# Patient Record
Sex: FEMALE | Race: ASIAN | NOT HISPANIC OR LATINO | URBAN - METROPOLITAN AREA
[De-identification: names, ages, dates, MRNs, and addresses within clinical notes are randomized per-mention and may not be internally consistent; named-entity substitution may affect disease eponyms.]

---

## 2017-04-15 ENCOUNTER — EMERGENCY (EMERGENCY)
Facility: HOSPITAL | Age: 62
LOS: 1 days | Discharge: HOME | End: 2017-04-15
Attending: EMERGENCY MEDICINE | Admitting: EMERGENCY MEDICINE
Payer: COMMERCIAL

## 2017-04-15 VITALS
OXYGEN SATURATION: 98 % | HEART RATE: 68 BPM | TEMPERATURE: 98 F | DIASTOLIC BLOOD PRESSURE: 83 MMHG | SYSTOLIC BLOOD PRESSURE: 169 MMHG | RESPIRATION RATE: 16 BRPM
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
## 2017-04-15 DIAGNOSIS — S06.6X0A TRAUMATIC SUBARACHNOID HEMORRHAGE WITHOUT LOSS OF CONSCIOUSNESS, INITIAL ENCOUNTER: ICD-10-CM

## 2017-04-15 DIAGNOSIS — S09.90XA UNSPECIFIED INJURY OF HEAD, INITIAL ENCOUNTER: ICD-10-CM
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
## 2017-04-15 LAB
ALBUMIN SERPL ELPH-MCNC: 4.3 G/DL — SIGNIFICANT CHANGE UP (ref 3.3–5)
ALP SERPL-CCNC: 57 U/L — SIGNIFICANT CHANGE UP (ref 40–120)
ALT FLD-CCNC: 20 U/L RC — SIGNIFICANT CHANGE UP (ref 10–45)
ANION GAP SERPL CALC-SCNC: 14 MMOL/L — SIGNIFICANT CHANGE UP (ref 5–17)
APTT BLD: 27.8 SEC — SIGNIFICANT CHANGE UP (ref 27.5–37.4)
AST SERPL-CCNC: 21 U/L — SIGNIFICANT CHANGE UP (ref 10–40)
BASOPHILS # BLD AUTO: 0 K/UL — SIGNIFICANT CHANGE UP (ref 0–0.2)
BASOPHILS NFR BLD AUTO: 0.6 % — SIGNIFICANT CHANGE UP (ref 0–2)
BILIRUB SERPL-MCNC: 0.4 MG/DL — SIGNIFICANT CHANGE UP (ref 0.2–1.2)
BUN SERPL-MCNC: 11 MG/DL — SIGNIFICANT CHANGE UP (ref 7–23)
CALCIUM SERPL-MCNC: 9.5 MG/DL — SIGNIFICANT CHANGE UP (ref 8.4–10.5)
CHLORIDE SERPL-SCNC: 105 MMOL/L — SIGNIFICANT CHANGE UP (ref 96–108)
CO2 SERPL-SCNC: 23 MMOL/L — SIGNIFICANT CHANGE UP (ref 22–31)
CREAT SERPL-MCNC: 0.52 MG/DL — SIGNIFICANT CHANGE UP (ref 0.5–1.3)
EOSINOPHIL # BLD AUTO: 0.1 K/UL — SIGNIFICANT CHANGE UP (ref 0–0.5)
EOSINOPHIL NFR BLD AUTO: 1.3 % — SIGNIFICANT CHANGE UP (ref 0–6)
GLUCOSE SERPL-MCNC: 99 MG/DL — SIGNIFICANT CHANGE UP (ref 70–99)
HCT VFR BLD CALC: 39 % — SIGNIFICANT CHANGE UP (ref 34.5–45)
HGB BLD-MCNC: 13.2 G/DL — SIGNIFICANT CHANGE UP (ref 11.5–15.5)
INR BLD: 0.93 RATIO — SIGNIFICANT CHANGE UP (ref 0.88–1.16)
LYMPHOCYTES # BLD AUTO: 2.7 K/UL — SIGNIFICANT CHANGE UP (ref 1–3.3)
LYMPHOCYTES # BLD AUTO: 35.5 % — SIGNIFICANT CHANGE UP (ref 13–44)
MCHC RBC-ENTMCNC: 30.8 PG — SIGNIFICANT CHANGE UP (ref 27–34)
MCHC RBC-ENTMCNC: 33.9 GM/DL — SIGNIFICANT CHANGE UP (ref 32–36)
MCV RBC AUTO: 90.8 FL — SIGNIFICANT CHANGE UP (ref 80–100)
MONOCYTES # BLD AUTO: 0.4 K/UL — SIGNIFICANT CHANGE UP (ref 0–0.9)
MONOCYTES NFR BLD AUTO: 5.2 % — SIGNIFICANT CHANGE UP (ref 2–14)
NEUTROPHILS # BLD AUTO: 4.4 K/UL — SIGNIFICANT CHANGE UP (ref 1.8–7.4)
NEUTROPHILS NFR BLD AUTO: 57.4 % — SIGNIFICANT CHANGE UP (ref 43–77)
PLATELET # BLD AUTO: 229 K/UL — SIGNIFICANT CHANGE UP (ref 150–400)
POTASSIUM SERPL-MCNC: 4 MMOL/L — SIGNIFICANT CHANGE UP (ref 3.5–5.3)
POTASSIUM SERPL-SCNC: 4 MMOL/L — SIGNIFICANT CHANGE UP (ref 3.5–5.3)
PROT SERPL-MCNC: 7.1 G/DL — SIGNIFICANT CHANGE UP (ref 6–8.3)
PROTHROM AB SERPL-ACNC: 10 SEC — SIGNIFICANT CHANGE UP (ref 9.8–12.7)
RBC # BLD: 4.29 M/UL — SIGNIFICANT CHANGE UP (ref 3.8–5.2)
RBC # FLD: 11.8 % — SIGNIFICANT CHANGE UP (ref 10.3–14.5)
SODIUM SERPL-SCNC: 142 MMOL/L — SIGNIFICANT CHANGE UP (ref 135–145)
WBC # BLD: 7.7 K/UL — SIGNIFICANT CHANGE UP (ref 3.8–10.5)
WBC # FLD AUTO: 7.7 K/UL — SIGNIFICANT CHANGE UP (ref 3.8–10.5)

## 2017-04-15 PROCEDURE — 85610 PROTHROMBIN TIME: CPT

## 2017-04-15 PROCEDURE — G0378: CPT

## 2017-04-15 PROCEDURE — 99284 EMERGENCY DEPT VISIT MOD MDM: CPT | Mod: 25

## 2017-04-15 PROCEDURE — 85730 THROMBOPLASTIN TIME PARTIAL: CPT

## 2017-04-15 PROCEDURE — 70450 CT HEAD/BRAIN W/O DYE: CPT

## 2017-04-15 PROCEDURE — 99220: CPT

## 2017-04-15 PROCEDURE — 80053 COMPREHEN METABOLIC PANEL: CPT

## 2017-04-15 PROCEDURE — 85027 COMPLETE CBC AUTOMATED: CPT

## 2017-04-15 PROCEDURE — 70450 CT HEAD/BRAIN W/O DYE: CPT | Mod: 26

## 2017-04-15 RX ORDER — ACETAMINOPHEN 500 MG
975 TABLET ORAL ONCE
Qty: 0 | Refills: 0 | Status: COMPLETED | OUTPATIENT
Start: 2017-04-15 | End: 2017-04-15

## 2017-04-15 RX ORDER — SODIUM CHLORIDE 9 MG/ML
3 INJECTION INTRAMUSCULAR; INTRAVENOUS; SUBCUTANEOUS EVERY 12 HOURS
Qty: 0 | Refills: 0 | Status: DISCONTINUED | OUTPATIENT
Start: 2017-04-15 | End: 2017-04-19

## 2017-04-15 RX ADMIN — Medication 975 MILLIGRAM(S): at 15:38

## 2017-04-15 NOTE — ED ADULT NURSE REASSESSMENT NOTE - NS ED NURSE REASSESS COMMENT FT1
Received pt from Rhoda RN cdu , received pt alert and responsive, oriented x4, denies any respiratory distress, SOB, or difficulty breathing. Pt transferred to CDU for observation for head injury. Ct of head showed SAH. Pt neurologlically intact, pt reports mild dizziness. IV in place, patent and free of signs of infiltration,  pt denies chest pain or palpitations, V/S stable, pt afebrile, pt denies pain at this time. Pt educated on unit and unit rules, instructed patient to notify RN of any needed assistance, Pt verbalizes understanding, Call bell placed within reach. Safety maintained. Will continue to monitor.

## 2017-04-15 NOTE — ED CDU PROVIDER NOTE - PROGRESS NOTE DETAILS
Patient resting in bed comfortably. NAD. Reports mild dull HA, does not want anything for pain right now. Denies neck pain, vision changes, dizziness, weakness, numbness, tingling. VSS. Neuro exam WNL. - Lilly Baltazar PA-C Patient asleep, resting in bed comfortably. No distress. Vital Signs Stable. -Lilly Baltazar PA-C Patient asleep, resting in bed comfortably. NAD. VSS. -Lilly Baltazar PA-C Patient sleeping. NAD. No complaints. VSS. repeat head CT with no change. - Melisa Olivares PA-C CDU NOTE FRANKLIN GUERRERO: NAD VSS.  Patient resting comfortably and has no current complaints. stable for DC at this time. discussed with Dr. Leal. paged neurosx to advise of plan for dc. will send home on SIVI bid Dr. Leal Note: I have personally performed a face to face diagnostic evaluation on this patient.  I have reviewed the ACP note and agree with the history, exam, and plan of care, except as noted.  History and Exam by me shows well appearing, mild headache and dizziness with head movement, no focal neuro deficits.  Repeat head ct unchanged, pt stable for dc home.

## 2017-04-15 NOTE — ED CDU PROVIDER NOTE - CHPI ED SYMPTOMS NEG
no weakness/no seizure/no change in level of consciousness/no syncope/no confusion/no vomiting/no loss of consciousness/no nausea/no blurred vision

## 2017-04-15 NOTE — ED CDU PROVIDER NOTE - ATTENDING CONTRIBUTION TO CARE
I, Dr. Wali Leal, saw and examined this patient and discussed the plan of care with the PA. I have personally performed a face to face diagnostic evaluation on this patient.  I have reviewed the ACP note and agree with the history, exam, and plan of care, except as noted.  History and Exam by me shows well appearing, no focal neuro deficits.  Small SAH, will repeat cth in interval time and neuro checks and reassess need for inpt vs dc.

## 2017-04-15 NOTE — ED ADULT NURSE NOTE - OBJECTIVE STATEMENT
Pt seen and treated by Manny PATEL 62yo female pt AxOx3 ambulatory to ED c/o dizziness/HA s/p slip and fall on oil today @ home. Pt denies LOC. Not on blood thinners. NAD noted. VSS. PERRLA 3mm brisk. Back of head tender to palp, skin intact, no obvious deformity noted. No other complaints at this time. Neuro exam intact. Full ROMx4. Family at bedside. Manny NP at bedside for eval. Safety maintained.

## 2017-04-15 NOTE — ED PROVIDER NOTE - PHYSICAL EXAMINATION
NAD, VSS, Afebrile, + mild B/L TMJ tender without swelling, + right occipital tender with mild swelling, No spinal tender, No pelvic or hip tender, Neuro- intact with steady gait.

## 2017-04-15 NOTE — ED PROVIDER NOTE - OBJECTIVE STATEMENT
62yo female pt with PMHx of osteoporosis c/o headache/ dizziness s/p head injury today, MD. Pt stated she fell on oil and hit head on tile floor at work. Denies LOC or visual changes. Denies N/V. Denies neck or back pain. Denies sensory changes or weakness to extremities. Denies CP/SOB/ABD pain or pelvic or hip pain. Denies fever, chills or recent cold symptoms. No AC.

## 2017-04-15 NOTE — ED PROVIDER NOTE - CHPI ED SYMPTOMS NEG
no change in level of consciousness/no loss of consciousness/no blurred vision/no weakness/no seizure/no confusion/no vomiting/no nausea/no syncope

## 2017-04-15 NOTE — ED CDU PROVIDER NOTE - PLAN OF CARE
Take Keppra 500mg twice daily for 7 days.   Follow up with Neurosurgeon, Dr. Aguilar, in office in 2 weeks.   Follow up with your Primary Care Physician within the next 2-3 days.  Bring a copy of your test results with you to your appointment.  Return to the Emergency Room if you experience worsening headache, vision changes, numbness, tingling, weakness, or any concerning symptoms. Take Keppra 500mg twice daily for 7 days.   Follow up with Neurosurgeon, Dr. Das, in office in 2 weeks. [phone]  Follow up with your Primary Care Physician within the next 2-3 days.  Bring a copy of your test results with you to your appointment.  Return to the Emergency Room if you experience worsening headache, vision changes, numbness, tingling, weakness, or any concerning symptoms.

## 2017-04-15 NOTE — ED PROVIDER NOTE - ATTENDING CONTRIBUTION TO CARE
I, Dr. Wali Leal, saw and examined this patient and discussed the plan of care with the NP.  Pt trip and fell, hit back of head, no LOC, c/o headache.  Neuro intact.

## 2017-04-15 NOTE — ED CDU PROVIDER NOTE - DETAILS
Subarachnoid Hemorrhage  -frequent reevaluations  -neuro checks q4h  -Repeat head CT @ 0600  -Neurosurgery following  -Case d/w Dr. Leal

## 2017-04-16 VITALS
OXYGEN SATURATION: 97 % | TEMPERATURE: 99 F | DIASTOLIC BLOOD PRESSURE: 66 MMHG | SYSTOLIC BLOOD PRESSURE: 115 MMHG | HEART RATE: 67 BPM | RESPIRATION RATE: 17 BRPM

## 2017-04-16 PROCEDURE — 99217: CPT

## 2017-04-16 PROCEDURE — 70450 CT HEAD/BRAIN W/O DYE: CPT | Mod: 26

## 2017-04-16 RX ORDER — LEVETIRACETAM 250 MG/1
1 TABLET, FILM COATED ORAL
Qty: 14 | Refills: 0 | OUTPATIENT
Start: 2017-04-16 | End: 2017-04-23

## 2017-04-16 RX ADMIN — SODIUM CHLORIDE 3 MILLILITER(S): 9 INJECTION INTRAMUSCULAR; INTRAVENOUS; SUBCUTANEOUS at 08:15

## 2017-04-26 PROBLEM — Z00.00 ENCOUNTER FOR PREVENTIVE HEALTH EXAMINATION: Status: ACTIVE | Noted: 2017-04-26

## 2017-05-08 ENCOUNTER — APPOINTMENT (OUTPATIENT)
Dept: SPINE | Facility: CLINIC | Age: 62
End: 2017-05-08

## 2017-05-08 VITALS
SYSTOLIC BLOOD PRESSURE: 146 MMHG | HEART RATE: 69 BPM | BODY MASS INDEX: 22.52 KG/M2 | HEIGHT: 58.66 IN | DIASTOLIC BLOOD PRESSURE: 84 MMHG | WEIGHT: 110.23 LBS

## 2017-05-08 DIAGNOSIS — S06.6X0S TRAUMATIC SUBARACHNOID HEMORRHAGE W/OUT LOSS OF CONSCIOUSNESS, SEQUELA: ICD-10-CM

## 2017-05-08 DIAGNOSIS — S06.0X9A CONCUSSION WITH LOSS OF CONSCIOUSNESS OF UNSPECIFIED DURATION, INITIAL ENCOUNTER: ICD-10-CM

## 2017-07-19 NOTE — ED ADULT NURSE NOTE - ED STAT RN HANDOFF WHERE 2
The patient has been examined and the H&P has been reviewed:    I concur with the findings and no changes have occurred since H&P was written.    Anesthesia/Surgery risks, benefits and alternative options discussed and understood by patient/family.          Active Hospital Problems    Diagnosis  POA    Bilateral renal artery stenosis [I70.1]  Yes      Resolved Hospital Problems    Diagnosis Date Resolved POA   No resolved problems to display.      CDU

## 2023-06-22 NOTE — ED ADULT NURSE REASSESSMENT NOTE - FACIAL SYMMETRY
symmetrical You can access the FollowMyHealth Patient Portal offered by [facility] by registering at the following website: [email or web address]. By joining [employer]’s FollowMyHealth portal, you will also be able to view your health information using other applications (apps) compatible with our system.

## 2024-01-28 ENCOUNTER — EMERGENCY (EMERGENCY)
Facility: HOSPITAL | Age: 69
LOS: 1 days | Discharge: HOME | End: 2024-01-28
Attending: EMERGENCY MEDICINE
Payer: MEDICARE

## 2024-01-28 VITALS
TEMPERATURE: 98 F | OXYGEN SATURATION: 97 % | HEART RATE: 88 BPM | WEIGHT: 130.07 LBS | DIASTOLIC BLOOD PRESSURE: 104 MMHG | HEIGHT: 64 IN | SYSTOLIC BLOOD PRESSURE: 168 MMHG | RESPIRATION RATE: 20 BRPM

## 2024-01-28 PROCEDURE — 72125 CT NECK SPINE W/O DYE: CPT | Mod: 26,MA

## 2024-01-28 PROCEDURE — 90471 IMMUNIZATION ADMIN: CPT

## 2024-01-28 PROCEDURE — 70450 CT HEAD/BRAIN W/O DYE: CPT | Mod: MA

## 2024-01-28 PROCEDURE — 71045 X-RAY EXAM CHEST 1 VIEW: CPT

## 2024-01-28 PROCEDURE — 93005 ELECTROCARDIOGRAM TRACING: CPT | Mod: XU

## 2024-01-28 PROCEDURE — 73030 X-RAY EXAM OF SHOULDER: CPT

## 2024-01-28 PROCEDURE — 72170 X-RAY EXAM OF PELVIS: CPT

## 2024-01-28 PROCEDURE — 90715 TDAP VACCINE 7 YRS/> IM: CPT

## 2024-01-28 PROCEDURE — 12002 RPR S/N/AX/GEN/TRNK2.6-7.5CM: CPT

## 2024-01-28 PROCEDURE — 99284 EMERGENCY DEPT VISIT MOD MDM: CPT | Mod: 25

## 2024-01-28 PROCEDURE — 70450 CT HEAD/BRAIN W/O DYE: CPT | Mod: 26,MA

## 2024-01-28 PROCEDURE — 73030 X-RAY EXAM OF SHOULDER: CPT | Mod: 26,RT

## 2024-01-28 PROCEDURE — 73060 X-RAY EXAM OF HUMERUS: CPT

## 2024-01-28 PROCEDURE — 71045 X-RAY EXAM CHEST 1 VIEW: CPT | Mod: 26

## 2024-01-28 PROCEDURE — 12001 RPR S/N/AX/GEN/TRNK 2.5CM/<: CPT

## 2024-01-28 PROCEDURE — 73060 X-RAY EXAM OF HUMERUS: CPT | Mod: 26,RT

## 2024-01-28 PROCEDURE — 72125 CT NECK SPINE W/O DYE: CPT | Mod: MA

## 2024-01-28 PROCEDURE — 72170 X-RAY EXAM OF PELVIS: CPT | Mod: 26

## 2024-01-28 PROCEDURE — 99285 EMERGENCY DEPT VISIT HI MDM: CPT | Mod: 25

## 2024-01-28 RX ORDER — ACETAMINOPHEN 500 MG
650 TABLET ORAL ONCE
Refills: 0 | Status: COMPLETED | OUTPATIENT
Start: 2024-01-28 | End: 2024-01-28

## 2024-01-28 RX ORDER — TETANUS TOXOID, REDUCED DIPHTHERIA TOXOID AND ACELLULAR PERTUSSIS VACCINE, ADSORBED 5; 2.5; 8; 8; 2.5 [IU]/.5ML; [IU]/.5ML; UG/.5ML; UG/.5ML; UG/.5ML
0.5 SUSPENSION INTRAMUSCULAR ONCE
Refills: 0 | Status: COMPLETED | OUTPATIENT
Start: 2024-01-28 | End: 2024-01-28

## 2024-01-28 RX ADMIN — TETANUS TOXOID, REDUCED DIPHTHERIA TOXOID AND ACELLULAR PERTUSSIS VACCINE, ADSORBED 0.5 MILLILITER(S): 5; 2.5; 8; 8; 2.5 SUSPENSION INTRAMUSCULAR at 22:30

## 2024-01-28 RX ADMIN — Medication 650 MILLIGRAM(S): at 22:30

## 2024-01-28 NOTE — ED PROVIDER NOTE - PROGRESS NOTE DETAILS
KYLE: scalp lac repaired by me at bedside, see procedure note.  c-collar applied by me, Mindy FARFAN/manuela, instructed patient/daughter to leave collar on at all times, follow-up with spine as outpatient for isolated small spinous process fx.

## 2024-01-28 NOTE — ED PROVIDER NOTE - ATTENDING CONTRIBUTION TO CARE
Attending MD Larsen:  I have seen and examined this patient and fully participated in the care of this patient as the teaching attending. I personally made/approved the management plan and take responsibility for the patient management.      68-year-old woman presenting for evaluation of fall with head injury.  Patient was walking upstairs and fell backwards hitting her head reportedly fell down 5-7 stairs.  Did hit her head but no loss of consciousness.  Pain is in the head and right shoulder.  No pain elsewhere no chest pain no shortness of breath.    Patient's vital signs are notable for elevated blood pressure 168 otherwise nonactionable.  GCS is 15 she is sitting up in the stretcher in no apparent distress.  There is a scalp laceration to the vertex scalp measuring approximately 5 cm, cervical collar in place, clear lungs anteriorly no chest wall tenderness abdomen nontender lower extremities atraumatic.    ECG recorded at 2213 independently interpreted by me, Dr Humberto Larsen, shows normal sinus rhythm normal axis normal intervals T wave inversions lead III lead aVF, T wave inversions lead V1 to V 4, no prior ECGs for comparison    Plan for CT head and cervical spine, update tetanus, screening chest film and pelvis film right shoulder films and reassessment.  Patient will require primary repair of scalp laceration as well      *The above represents an initial assessment/impression. Please refer to progress notes for potential changes in patient clinical course*

## 2024-01-28 NOTE — ED PROVIDER NOTE - OBJECTIVE STATEMENT
69 yo pmhx dementia presents for witnessed fall. fell 7pm while walking upstairs, witnessed by daughter. missed step. fell down 7 stairs, fell backwards hit head, no LOC. not on AC. denies any preceding symptoms. after fall had bleeding scalp laceration wrapped by EMS. unknown last tetanus. also endorses R shoulder pain and swelling. denies headache, dizziness, nausea, chest pain, SOB, abdominal pain, focal weakness of UE or LE.

## 2024-01-28 NOTE — ED ADULT NURSE NOTE - NSFALLUNIVINTERV_ED_ALL_ED
Bed/Stretcher in lowest position, wheels locked, appropriate side rails in place/Call bell, personal items and telephone in reach/Instruct patient to call for assistance before getting out of bed/chair/stretcher/Non-slip footwear applied when patient is off stretcher/Walls to call system/Physically safe environment - no spills, clutter or unnecessary equipment/Purposeful proactive rounding/Room/bathroom lighting operational, light cord in reach

## 2024-01-28 NOTE — ED ADULT NURSE NOTE - OBJECTIVE STATEMENT
68y F PMH HTN bibems from home s/p witnessed fall. Pt primarily Divehi speaking. Interepter accessed [id number]. Family at bedside reports they witnessed pt fall down aprox 10-15steps. Pt reports trip and fall. Denies syncope, dizziness at time of fall. Pt able to ambulate s/p fall. -LOC, -AC use, +head strike. pt presents in c-collar placed by EMS. Pt presents with laceration to top of head. bleeding controlled by family and EMS prior to ED arrival. Pt A&Ox4. Denies cp, sob, nvd, abd pain, fevers, chills. Comfort and safety maintained.

## 2024-01-28 NOTE — ED PROVIDER NOTE - CARE PLAN
Principal Discharge DX:	Fall   1 Principal Discharge DX:	Scalp laceration  Secondary Diagnosis:	Closed head injury, initial encounter

## 2024-01-28 NOTE — ED PROVIDER NOTE - WET READ LAUNCH FT
There are no Wet Read(s) to document. There are 4 Wet Read(s) to document. There are 2 Wet Read(s) to document. There is 1 Wet Read(s) to document.

## 2024-01-28 NOTE — ED PROVIDER NOTE - PHYSICAL EXAMINATION
GENERAL: well appearing in no acute distress, non-toxic appearing  HEAD: 3cm laceration top of scalp no active bleeding  CARDIAC: regular rate and rhythm, normal S1S2, no appreciable murmurs, 2+ pulses in UE/LE b/l  PULM: normal breath sounds, clear to ascultation bilaterally, no rales, rhonchi, wheezing  GI: abdomen nondistended, soft, nontender, no guarding, rebound tenderness  NEURO: no focal motor or sensory deficits, CN2-12 intact, normal speech, PERRLA, EOMI,   MSK: R shoulder anterior swelling ecchymosis pain w flexion, + c spine midline tenderness. no t/l spine tenderness. no chest wall or rib tenderness. pelvis stable. full ROM b/l LE.   SKIN: 3cm laceration no active bleeding scalp

## 2024-01-28 NOTE — ED PROVIDER NOTE - PATIENT PORTAL LINK FT
You can access the FollowMyHealth Patient Portal offered by [facility] by registering at the following website: [email or web address]. By joining [employer]’s FollowMyHealth portal, you will also be able to view your health information using other applications (apps) compatible with our system.

## 2024-01-28 NOTE — ED PROVIDER NOTE - CLINICAL SUMMARY MEDICAL DECISION MAKING FREE TEXT BOX
witnessed fall head strike, no LOC, no preceding symptoms, laceration top of scalp and R anterior shoulder ecchymosis and erythema. CT head/neck xray shoulder and chest tetanus

## 2024-01-28 NOTE — ED PROVIDER NOTE - NSFOLLOWUPINSTRUCTIONS_ED_ALL_ED_FT
??? ??? ???????. ??? ????? ????. ???? ??? ?? 10? ?? ?????.    2~3? ??? ????? ?? ??? ?????.    ??, ??? ?? ?? ???? ??? ???? ??????.

## 2024-01-29 PROBLEM — M81.0 AGE-RELATED OSTEOPOROSIS WITHOUT CURRENT PATHOLOGICAL FRACTURE: Chronic | Status: ACTIVE | Noted: 2017-04-15

## 2024-01-29 NOTE — ED POST DISCHARGE NOTE - RESULT SUMMARY
spoke with Dr. Duncan. request patient be called and informed to follow up with Dr. Harpreet Anderson due to acute fracture involving the spinous process of c4. spoke with patient daughter America Haro and grandson Mr. Haro. verbalized understanding and states patient will follow up. phone number of Dr. Anderson provided to family. all questions answered.

## 2024-02-07 ENCOUNTER — EMERGENCY (EMERGENCY)
Facility: HOSPITAL | Age: 69
LOS: 1 days | Discharge: HOME | End: 2024-02-07
Attending: EMERGENCY MEDICINE
Payer: MEDICARE

## 2024-02-07 VITALS
HEIGHT: 60 IN | TEMPERATURE: 98 F | HEART RATE: 69 BPM | DIASTOLIC BLOOD PRESSURE: 74 MMHG | WEIGHT: 100.09 LBS | OXYGEN SATURATION: 97 % | RESPIRATION RATE: 16 BRPM | SYSTOLIC BLOOD PRESSURE: 126 MMHG

## 2024-02-07 PROCEDURE — G0463: CPT

## 2024-02-07 PROCEDURE — L9995: CPT

## 2024-02-07 NOTE — ED PROVIDER NOTE - CARE PROVIDER_API CALL
Evan Anderson Harpreet  Neurosurgery  [address], [address]  [place] [address]  Phone: [phone]  Fax: [phone]  Follow Up Time:

## 2024-02-07 NOTE — ED PROVIDER NOTE - PATIENT PORTAL LINK FT
You can access the FollowMyHealth Patient Portal offered by [facility] by registering at the following website: [email or web address]. By joining [employer]’s FollowMyHealth portal, you will also be able to view your health information using other applications (apps) compatible with our system.

## 2024-02-07 NOTE — ED PROVIDER NOTE - ATTENDING APP SHARED VISIT CONTRIBUTION OF CARE
I, Alpesh Marroquin MD, Emergency Medicine Attending Physician, personally saw and examined the patient and I personally made/approve the management plan and take responsibility for the patient management.    MDM: 68-year-old female with history of dementia who presents for staple removal.  Patient had witnessed fall and was seen in the ED on 1/28 and had staples placed in the scalp.  Patient had imaging that showed "acute appearing C4 spinous process fracture."  Patient was placed in [place] collar and recommended to follow-up with spine/neurosurgery Dr. Anderson.  However patient has not yet followed up with the spine doctor.    ROS: Denies fevers, chills,headache, numbness, weakness, visual changes, imbalance, chest pain, shortness of breath    On examination, patient with stable vitals, examination of head shows staples in place, no dehiscence, no erythema, warmth, discharge or swelling.  C-collar in place.  5/5 motor and normal sensation in all 4 extremities, normal reflexes, negative [place] test, equal pulses.    Patient was seen in the ED for staple removal. This is an appropriate time for removal based on body part affected. There are no signs of systemic or local infection. There is no drainage, swelling, warmth or dehiscence. The site is clean, dry, and intact. All staples were removed, see procedure note.     Patient is stable in the ED for discharge and advised to follow up with PMD and urgently with neurosurgery/spine regarding her C4 fracture.    Patient counseled on all findings, diagnosis and treatment plan. Patient's questions and concerns addressed. Patient stable, discharged with instructions to follow up with PMD and NSG/spine within 2 to 3 days, and to return to ED at any time for worsening symptoms or any other concerns. Patient demonstrates understanding of the findings and the importance of appropriate follow up care.

## 2024-02-07 NOTE — ED PROVIDER NOTE - NSFOLLOWUPINSTRUCTIONS_ED_ALL_ED_FT
1) Please follow-up with your Primary Medical Doctor in 2-3 days. If you do not have a primary doctor, please call the Physician Referral Service. If you have trouble making an appointment inform the office that you were recently seen in the Emergency Department and it is an urgent matter. Bring a copy of your results with you to your appointment.  2) Return to the Emergency Department for persistent, worsening, or new symptoms, or if you experience fever, chills, chest pain, shortness of breath, dizziness, fainting, abdominal pain, nausea or vomiting, inability to eat or drink, difficulty with urination, numbness, weakness, or inability to walk safely.   3) It is very important for you to see the spine specialist/neurosurgeon within the next 2 to 3 days.

## 2024-02-07 NOTE — ED PROVIDER NOTE - NS ED MD DISPO DISCHARGE CCDA
Dr. Andres Jones notified of consult via office.  Roselyn Ovalles 11/16/2020 9:19 AM Patient/Caregiver provided printed discharge information.

## 2024-02-12 ENCOUNTER — APPOINTMENT (OUTPATIENT)
Dept: SPINE | Facility: CLINIC | Age: 69
End: 2024-02-12
Payer: MEDICARE

## 2024-02-12 ENCOUNTER — RESULT REVIEW (OUTPATIENT)
Age: 69
End: 2024-02-12

## 2024-02-12 ENCOUNTER — OUTPATIENT (OUTPATIENT)
Dept: OUTPATIENT SERVICES | Facility: HOSPITAL | Age: 69
LOS: 1 days | End: 2024-02-12
Payer: MEDICARE

## 2024-02-12 ENCOUNTER — APPOINTMENT (OUTPATIENT)
Dept: RADIOLOGY | Facility: CLINIC | Age: 69
End: 2024-02-12
Payer: MEDICARE

## 2024-02-12 VITALS
HEIGHT: 59 IN | OXYGEN SATURATION: 95 % | DIASTOLIC BLOOD PRESSURE: 86 MMHG | HEART RATE: 67 BPM | SYSTOLIC BLOOD PRESSURE: 134 MMHG | BODY MASS INDEX: 24.19 KG/M2 | WEIGHT: 120 LBS

## 2024-02-12 DIAGNOSIS — S12.9XXA FRACTURE OF NECK, UNSPECIFIED, INITIAL ENCOUNTER: ICD-10-CM

## 2024-02-12 DIAGNOSIS — Z78.9 OTHER SPECIFIED HEALTH STATUS: ICD-10-CM

## 2024-02-12 PROCEDURE — 72052 X-RAY EXAM NECK SPINE 6/>VWS: CPT | Mod: 26

## 2024-02-12 PROCEDURE — 72052 X-RAY EXAM NECK SPINE 6/>VWS: CPT

## 2024-02-12 PROCEDURE — 99203 OFFICE O/P NEW LOW 30 MIN: CPT

## 2024-02-12 RX ORDER — ACETAMINOPHEN 500 MG/1
500 TABLET, COATED ORAL
Refills: 0 | Status: ACTIVE | COMMUNITY

## 2024-02-12 NOTE — REASON FOR VISIT
[New Patient Visit] : a new patient visit [Other: _____] : [unfilled] [Referred By: _________] : Patient was referred by ADRIAN [FreeTextEntry1] : Cervical fracture

## 2024-02-12 NOTE — HISTORY OF PRESENT ILLNESS
[< 3 months] : less than 3 months [FreeTextEntry1] : Cervical fracture  [de-identified] : Mr. Padron is a 68 year old female who was initially seen on 5/8/2017, 3 weeks prior to the visit the patient slipped and fell at work striking the back of her head. She was seen at [facility] where several CT scans showed a small amount and the hemispheric subarachnoid hemorrhage. The patient initially had headache and dizziness which were resolving. Denied any numbness, tingling or weakness.  The patient missed a step and fell down 7 stairs and fell backwards hitting his head on 1/28/2024 no LOC. She was evaluated at [facility] hospital, CT head and cervical were done and discharged with hard cervical collar. The patient sustained a laceration top of scalp which is healing well. Denies any trouble with balance, weakness, tingling or numbness in hands.  Cervical CT scan showed 1 side of the bifid C4 spinous process fracture.  Patient has been in a hard cervical collar.  Patient refused Indonesian  services. Her grandson serves as .

## 2024-02-12 NOTE — ASSESSMENT
[FreeTextEntry1] : 68 year old female who sustained acute fracture involving the spinous process of C4 s/p fall 1/28/2024. Neurologically intact. No neurosurgical intervention indicated. Cervical flexion-extension x-rays ordered to r/o instability. Patient is to remain in hard cervical collar until x-ray is reviewed.

## 2024-02-12 NOTE — PHYSICAL EXAM
[General Appearance - Alert] : alert [General Appearance - In No Acute Distress] : in no acute distress [General Appearance - Well-Appearing] : healthy appearing [Oriented To Time, Place, And Person] : oriented to person, place, and time [Impaired Insight] : insight and judgment were intact [Affect] : the affect was normal [Person] : oriented to person [Place] : oriented to place [Time] : oriented to time [Short Term Intact] : short term memory intact [Remote Intact] : remote memory intact [Registration Intact] : recent registration memory intact [Span Intact] : the attention span was normal [Concentration Intact] : normal concentrating ability [Visual Intact] : visual attention was ~T not ~L decreased [Fluency] : fluency intact [Comprehension] : comprehension intact [Reading] : reading intact [Current Events] : adequate knowledge of current events [Past History] : adequate knowledge of personal past history [Vocabulary] : adequate range of vocabulary [Cranial Nerves Trigeminal (V)] : facial sensation intact symmetrically [Cranial Nerves Facial (VII)] : face symmetrical [Cranial Nerves Vestibulocochlear (VIII)] : hearing was intact bilaterally [Cranial Nerves Glossopharyngeal (IX)] : tongue and palate midline [Cranial Nerves Accessory (XI - Cranial And Spinal)] : head turning and shoulder shrug symmetric [Cranial Nerves Hypoglossal (XII)] : there was no tongue deviation with protrusion [Motor Tone] : muscle tone was normal in all four extremities [Motor Strength] : muscle strength was normal in all four extremities [Sensation Tactile Decrease] : light touch was intact [Abnormal Walk] : normal gait [Balance] : balance was intact [2+] : Ankle jerk left 2+ [No Tenderness to Palpation] : no spine tenderness on palpation [___] : absent on the right [___] : absent on the left [Taylor] : Taylor's sign was not demonstrated [L'Hermitte's] : neck flexion did not produce tingling down the spine/arms [Spurling's - Opposite Side] : Negative Spurling's on opposite side [Spurling's Same Side] : Negative Spurling's on same side

## 2024-09-04 ENCOUNTER — RX ONLY (RX ONLY)
Age: 69
End: 2024-09-04

## 2024-09-04 ENCOUNTER — OFFICE (OUTPATIENT)
Facility: LOCATION | Age: 69
Setting detail: OPHTHALMOLOGY
End: 2024-09-04
Payer: MEDICARE

## 2024-09-04 DIAGNOSIS — H43.813: ICD-10-CM

## 2024-09-04 DIAGNOSIS — H25.13: ICD-10-CM

## 2024-09-04 DIAGNOSIS — H16.223: ICD-10-CM

## 2024-09-04 PROBLEM — R73.03 OTHER ABNORMAL GLUCOSE: Status: ACTIVE | Noted: 2024-09-04

## 2024-09-04 PROCEDURE — 99213 OFFICE O/P EST LOW 20 MIN: CPT

## 2024-09-04 ASSESSMENT — CONFRONTATIONAL VISUAL FIELD TEST (CVF)
OS_FINDINGS: FULL
OD_FINDINGS: FULL

## 2024-12-27 NOTE — ED CDU PROVIDER NOTE - CONSTITUTIONAL NEGATIVE STATEMENT, MLM
no fever and no chills. unable to perform Brief Interview for Mental Status (BIMS) due to decreased ability to follow commands and lethargy/impaired

## 2025-03-05 ENCOUNTER — OFFICE (OUTPATIENT)
Facility: LOCATION | Age: 70
Setting detail: OPHTHALMOLOGY
End: 2025-03-05
Payer: MEDICARE

## 2025-03-05 DIAGNOSIS — H43.813: ICD-10-CM

## 2025-03-05 DIAGNOSIS — H25.13: ICD-10-CM

## 2025-03-05 DIAGNOSIS — H16.223: ICD-10-CM

## 2025-03-05 DIAGNOSIS — R73.03: ICD-10-CM

## 2025-03-05 PROCEDURE — 92134 CPTRZ OPH DX IMG PST SGM RTA: CPT | Performed by: OPTOMETRIST

## 2025-03-05 PROCEDURE — 92014 COMPRE OPH EXAM EST PT 1/>: CPT | Performed by: OPTOMETRIST

## 2025-03-05 PROCEDURE — 92202 OPSCPY EXTND ON/MAC DRAW: CPT | Performed by: OPTOMETRIST

## 2025-03-05 ASSESSMENT — DECREASING TEAR LAKE - SEVERITY SCORE
OS_DEC_TEARLAKE: 2+
OD_DEC_TEARLAKE: 2+

## 2025-03-05 ASSESSMENT — CONFRONTATIONAL VISUAL FIELD TEST (CVF)
OS_FINDINGS: FULL
OD_FINDINGS: FULL

## 2025-03-05 ASSESSMENT — TONOMETRY
OD_IOP_MMHG: 16
OS_IOP_MMHG: 16

## 2025-03-05 ASSESSMENT — REFRACTION_AUTOREFRACTION
OD_AXIS: 068
OD_CYLINDER: -1.75
OS_CYLINDER: -0.25
OS_SPHERE: +1.00
OD_SPHERE: +1.75
OS_AXIS: 084

## 2025-03-05 ASSESSMENT — SUPERFICIAL PUNCTATE KERATITIS (SPK)
OS_SPK: 2+
OD_SPK: 2+

## 2025-03-05 ASSESSMENT — TEAR BREAK UP TIME (TBUT)
OS_TBUT: 2+
OD_TBUT: 2+

## 2025-03-05 ASSESSMENT — PACHYMETRY
OD_CT_CORRECTION: 4
OD_CT_UM: 492
OS_CT_UM: 490
OS_CT_CORRECTION: 4

## 2025-03-05 ASSESSMENT — REFRACTION_MANIFEST
OD_CYLINDER: -1.75
OD_SPHERE: +1.75
OS_CYLINDER: -0.25
OS_VA1: 20/30+1
OS_AXIS: 084
OD_VA1: 20/30+2
OS_SPHERE: +1.00
OD_AXIS: 068

## 2025-03-05 ASSESSMENT — KERATOMETRY
METHOD_AUTO_MANUAL: AUTO
OD_AXISANGLE_DEGREES: 141
OS_AXISANGLE_DEGREES: 105
OD_K1POWER_DIOPTERS: 44.75
OD_K2POWER_DIOPTERS: 45.75
OS_K2POWER_DIOPTERS: 45.00
OS_K1POWER_DIOPTERS: 44.75

## 2025-03-05 ASSESSMENT — VISUAL ACUITY
OD_BCVA: 20/30-2
OS_BCVA: 20/30